# Patient Record
Sex: FEMALE | Race: WHITE
[De-identification: names, ages, dates, MRNs, and addresses within clinical notes are randomized per-mention and may not be internally consistent; named-entity substitution may affect disease eponyms.]

---

## 2017-07-31 ENCOUNTER — HOSPITAL ENCOUNTER (OUTPATIENT)
Dept: HOSPITAL 46 - OPS | Age: 77
Discharge: HOME | End: 2017-07-31
Attending: SURGERY
Payer: MEDICARE

## 2017-07-31 VITALS — BODY MASS INDEX: 33.57 KG/M2 | HEIGHT: 60 IN | WEIGHT: 170.99 LBS

## 2017-07-31 DIAGNOSIS — E78.5: ICD-10-CM

## 2017-07-31 DIAGNOSIS — K29.50: Primary | ICD-10-CM

## 2017-07-31 DIAGNOSIS — I10: ICD-10-CM

## 2017-07-31 DIAGNOSIS — K21.9: ICD-10-CM

## 2017-07-31 DIAGNOSIS — Z87.440: ICD-10-CM

## 2017-07-31 DIAGNOSIS — D64.9: ICD-10-CM

## 2017-07-31 DIAGNOSIS — M81.0: ICD-10-CM

## 2017-07-31 DIAGNOSIS — E03.9: ICD-10-CM

## 2017-07-31 DIAGNOSIS — K44.9: ICD-10-CM

## 2017-07-31 DIAGNOSIS — Z98.890: ICD-10-CM

## 2017-07-31 DIAGNOSIS — E66.9: ICD-10-CM

## 2017-07-31 PROCEDURE — 0DB38ZX EXCISION OF LOWER ESOPHAGUS, VIA NATURAL OR ARTIFICIAL OPENING ENDOSCOPIC, DIAGNOSTIC: ICD-10-PCS | Performed by: SURGERY

## 2017-07-31 PROCEDURE — 0DB78ZX EXCISION OF STOMACH, PYLORUS, VIA NATURAL OR ARTIFICIAL OPENING ENDOSCOPIC, DIAGNOSTIC: ICD-10-PCS | Performed by: SURGERY

## 2017-07-31 PROCEDURE — 0DB68ZX EXCISION OF STOMACH, VIA NATURAL OR ARTIFICIAL OPENING ENDOSCOPIC, DIAGNOSTIC: ICD-10-PCS | Performed by: SURGERY

## 2017-07-31 NOTE — NUR
07/31/17 0811 Jaycee Ayala
0805 - PT ARRIVED TO PACU. BP NOTED TO BE LOW, FLUIDS RUNNING, HEAD OF
BED LOWERED. PT RESPONDING APPROPRIATLY TO QUESTIONS. PT ORIENTED TO
PERSON, PLACE, TIME, AND SITUATION. PT MAINTAINING OWN AIRWAY AND
DENIES PAIN AND NAUSEA

## 2017-08-05 NOTE — OR
[facility]
                                    [address], [place]  [address]
_________________________________________________________________________________________
                                                                 Signed   
 
 
DATE OF SERVICE: 07/31/2017
 
PREOPERATIVE DIAGNOSES: 
Dark stool with anemia.
Unremarkable colonoscopy 1 year ago.
 
POSTOPERATIVE DIAGNOSES: 
Diffuse gastritis without active hemorrhage, small hiatal hernia without esophagitis.
 
PROCEDURE PERFORMED: Esophagogastroduodenoscopy with biopsy. 
 
SURGEON: Romi Soliman MD.
 
ANESTHESIA: 
Intravenous sedation, fentanyl 100 mcg, Versed 3 mg.
 
INDICATION: 
This 77-year-old white woman, is a patient of Dr. Reid and underwent colonoscopy in
2016, which was essentially normal. Certainly, no evidence of neoplasm or significant
polyp disease. She has developed dark stool and low-grade anemia. She has had no yudi
hematemesis. She denies any upper abdominal pain. She describes being "allergic" to
Prilosec. She is admitted at this time to undergo upper endoscopy to better characterize
the source of her complaint. Understand the risks of bleeding, infection, and
perforation.
 
FINDINGS: 
Esophagus was normal as was the duodenum. The stomach had diffuse gastritis. A punctate
type significant inflammatory change of the upper stomach. Biopsies were obtained.
CLOtest was negative 15 minutes post procedure. CLOtest will be monitored for at least
24 hours.
 
DESCRIPTION OF PROCEDURE: 
The patient brought to the endoscopy suite and placed in later al decubitus position
after undergoing topical Hurricaine spray hypopharyngeal anesthesia. A bite block was
placed after satisfactory intravenous sedation with full cardiopulmonary monitoring.
 
An Olympus video upper endoscope was placed in the hypopharynx. Vocal cords were
visualized as normal. Scope was advanced to the esophagus without problem. Throughout
its length, it was normal. Scope was advanced to the stomach, which was insufflated with
air. There was a fair amount of bile within the stomach. Rug a l folds appeared normal
in configuration. Scope was advanced to the antrum, which showed some mild erythematous
streaking of the pre-pyloric antrum. The pylorus itself was not deformed and there is no
 
    Electronically Signed By: ROMI SOLIMAN MD  08/05/17 1032
_________________________________________________________________________________________
PATIENT NAME:     SHASTA BEARDEN                  
MEDICAL RECORD #: [id number]                     OPERATIVE REPORT              
          ACCT #: [id number]  
DATE OF BIRTH:   [birth year]                                       
PHYSICIAN:   ROMI SOLIMAN MD                      REPORT #: [id number]
REPORT IS CONFIDENTIAL AND NOT TO BE RELEASED WITHOUT AUTHORIZATION
 
 
                                  [facility]
                                    [address], [place]  [address]
_________________________________________________________________________________________
                                                                 Signed   
 
 
pre-pyloric ulcer proper. The scope was passed into the duodenal, in the bulbar second
and third portions all appeared reasonably normal. Biopsies were taken of the second and
third portions. This was to assess for celiac disease. The scope was then withdrawn.
Biopsies taken of the antrum for both clinical and pathologic testing, and a retroflex
view was undertaken showing punctate diffuse gastritis throughout the stomach. An
atrophic appearing mucosa was noted. Biopsies were taken more proximally as well. There
was a hiatal hernia noted at the GE junction. The scope was straightened and withdrawn
to the distal esophagus, where biopsies were obtained. The mucosa appeared normal.
Careful withdrawal of scope showed no other abnormalities. Scope was removed, and the
patient was taken to recovery room in good condition.
 
CONCLUDING DIAGNOSIS: 
Diffuse gastritis likely accounting for symptoms.
 
PLAN 
We will prescribe Carafate 1 g slurry q.i.d. on empty stomach. Avoidance of ibuprofen as
much as possible in the near-term future would be appropriate. We will recheck CBC in
approximately 4 weeks and she can return to clinic in 4-6 weeks in followup.
 
 
_____________________________
MD TICO Holly/Dany
DD: 07/31/2017 08:11:14
DT: 07/31/2017 10:28:43
Job #: [id number]
 
 
 
 
 
 
 
 
 
 
 
 
 
 
 
 
    Electronically Signed By: ROMI SOLIMAN MD  08/05/17 1032
_________________________________________________________________________________________
PATIENT NAME:     SHASTA BEARDEN                  
MEDICAL RECORD #: [id number]                     OPERATIVE REPORT              
          ACCT #: [id number]  
DATE OF BIRTH:   [birth year]                                       
PHYSICIAN:   ROMI SOLIMAN MD                      REPORT #: [id number]
REPORT IS CONFIDENTIAL AND NOT TO BE RELEASED WITHOUT AUTHORIZATION

## 2022-07-24 ENCOUNTER — HOSPITAL ENCOUNTER (INPATIENT)
Dept: HOSPITAL 46 - ED | Age: 82
LOS: 2 days | Discharge: HOME | DRG: 641 | End: 2022-07-26
Attending: INTERNAL MEDICINE | Admitting: INTERNAL MEDICINE
Payer: MEDICARE

## 2022-07-24 VITALS — HEIGHT: 60 IN | BODY MASS INDEX: 35.58 KG/M2 | WEIGHT: 181.22 LBS

## 2022-07-24 DIAGNOSIS — I10: ICD-10-CM

## 2022-07-24 DIAGNOSIS — K59.00: ICD-10-CM

## 2022-07-24 DIAGNOSIS — Z79.82: ICD-10-CM

## 2022-07-24 DIAGNOSIS — Z98.890: ICD-10-CM

## 2022-07-24 DIAGNOSIS — I35.0: ICD-10-CM

## 2022-07-24 DIAGNOSIS — M81.0: ICD-10-CM

## 2022-07-24 DIAGNOSIS — N39.0: ICD-10-CM

## 2022-07-24 DIAGNOSIS — Z79.01: ICD-10-CM

## 2022-07-24 DIAGNOSIS — E87.1: ICD-10-CM

## 2022-07-24 DIAGNOSIS — Z20.822: ICD-10-CM

## 2022-07-24 DIAGNOSIS — Z91.013: ICD-10-CM

## 2022-07-24 DIAGNOSIS — K21.9: ICD-10-CM

## 2022-07-24 DIAGNOSIS — Z79.899: ICD-10-CM

## 2022-07-24 DIAGNOSIS — Z79.02: ICD-10-CM

## 2022-07-24 DIAGNOSIS — E78.00: ICD-10-CM

## 2022-07-24 DIAGNOSIS — Z79.2: ICD-10-CM

## 2022-07-24 DIAGNOSIS — Z88.8: ICD-10-CM

## 2022-07-24 DIAGNOSIS — I48.91: ICD-10-CM

## 2022-07-24 DIAGNOSIS — Z90.710: ICD-10-CM

## 2022-07-24 DIAGNOSIS — E03.9: ICD-10-CM

## 2022-07-24 DIAGNOSIS — E87.6: Primary | ICD-10-CM

## 2022-07-24 PROCEDURE — U0003 INFECTIOUS AGENT DETECTION BY NUCLEIC ACID (DNA OR RNA); SEVERE ACUTE RESPIRATORY SYNDROME CORONAVIRUS 2 (SARS-COV-2) (CORONAVIRUS DISEASE [COVID-19]), AMPLIFIED PROBE TECHNIQUE, MAKING USE OF HIGH THROUGHPUT TECHNOLOGIES AS DESCRIBED BY CMS-2020-01-R: HCPCS

## 2022-07-24 PROCEDURE — C9803 HOPD COVID-19 SPEC COLLECT: HCPCS

## 2022-07-24 PROCEDURE — A9270 NON-COVERED ITEM OR SERVICE: HCPCS

## 2022-07-25 NOTE — NUR
Pt continues to have gas and no BM, urine incontinence noted, assisted with
ambulation and dry attends provided.

## 2022-07-25 NOTE — NUR
VITALS AND I&OS CHARTED PATIENT
UP TO BSC FOR VOID AND BACK TO BED. RN IN ROOM AT THIS TIME. CALL
LIGHT IN EASY REACH.

## 2022-07-25 NOTE — NUR
Pt arrives to medical floor room 109. Pt A+O, VSS. Assessment WNL. Tele in
place, SR. IV SL WNL. Pt oriented to room/unit, states no needs. Call light in
reach.

## 2022-07-25 NOTE — NUR
PATIENT BACK IN BED AT THIS TIME AND RESTING. PT GIVEN SUPPOSITORY TO HELP HER
HAVE A BM, WHICH SHE STATES SHE HASN'T HAD IN 3 DAYS. PT FRUSTRATED WITH THIS.
PT'S FAMILY HAS LEFT FOR THE DAY BUT WERE HERE VISITING FOR SEVERAL HOURS THIS
AM. PT REMAINS IN SINUS RHYTHM. HR IN THE60s. PT DENIES ANY PAIN AT THIS TIME.
ASSESSMENT COMPLETE AND UNCHANGED FROM THIS AM. PT REMAINS SALINE LOCKED.
CONTINUE DAYNA ONITOR.

## 2022-07-25 NOTE — NUR
patient CALLED TO USE THE BATHROOM. SBA. PATIENT REFUSED TO USE WALKER.
PATIENT'S PULL UP AND GEOFFREY PAD CHANGED DUE TO INCONTINENT URINE. PATIENT IS
BACK IN BED. PATIENT DENIES FURTHER NEEDS AT THIS TIME. BED SIDE RAILS UP AND
BED ALARM ON FOR SAFETY. CALL LIGHT WITHIN REACH.

## 2022-07-25 NOTE — NUR
PATIENT TRANSFERRED TOMED/SURG, ROOM 109 IN BED. REPORT GIVEN TO TISHA JOSEPH WHO
IS RESUMING CARE OF PATIENT. PT'S RHONDA CELIS [phone] CALLED
TO UPDATE ON PT'S
MOVE. ALL BELONGINGS TRANSFERRED WITH PATIENT.

## 2022-07-25 NOTE — NUR
1PA TO BSC FOR VOID, PATIENT WAS ALSO INCONTINENT OF URINE DURING TRANSFER TO
BSC. VITALS AND I&OS CHARTED, FACE AND HANDS WASHED. PATIENT UP IN DR RICHARD MARTINEZ AT CHAIRSIDE. CALL LIGHT IN EASY REACH

## 2022-07-25 NOTE — NUR
BEDSIDE REPORT FROMDEIRDRE RN , PT RESTING IN BED EYES CLOSED RR REGULAR AT
16 BPM. NO DISTRESS NOTED AT THIS TIME.

## 2022-07-25 NOTE — NUR
DR. RAMOS IN ROOM TO SEE PATIENT AT THIS TIME. PT UP SITTING IN CHAIR,
VISITING WITH MD. PLAN OF CARE BEING DISCUSSED. PT REMAINS IN A SINUS RHYTHM
SINCE AROUND 0230 THIS AM. LAST /69. IVF CONTINUE  ML/HR. PT
APPEARS COMFORTABLE.

## 2022-07-25 NOTE — NUR
IN ROOM TO GIVE AM MEDS. PATIENT REPORTED NEEDING TO USE THE RESTROOM. 1
PERSON ASSIST TO BEDSIDE COMMODE. URGE INCONTINANCE WITH VOID. CLEAN ATTENDS
PLACE ON PT. HR BUMPED TO THE 90'S WITH AMBULATION.

## 2022-07-25 NOTE — NUR
REPORT RECEIVED FROM DAGO GILES. PATIENT ARRIVED VIA STRETCHER WITH FAMILY AT
BEDSIDE. PATIENT HAD TO USE THE RESTROOM AND USED THE BEDSIDE COMMODE. SHE
REOPRTS URGE INCONTINANCE AND BURNING DUE TO RECENT UTI.
ORIENTED TO CALL LIGHT AND ROOM.
FAMILY AT BEDSIDE. CALL LIGHT IN REACH.

## 2022-07-25 NOTE — NUR
ECHO IN ROOM AT THIS TIME AND PERFORMING ECHO. PT HELPED BACK TO BED WITH
ASSISTANCE OF CNA. PT UP TO BSC TO VOID AFTER INCONTINENCE IN ATTENDS. PT
REMAINS IN SINUS RHYTHM.

## 2022-07-25 NOTE — NUR
Pt assisted to use restroom, some incontinence of urine noted, clean dry
attends provided. Scheduled lovenox administered and pt educated, verbalizes
understanding about POC and has no questions.

## 2022-07-25 NOTE — NUR
Scheduled medications administered and pt finishing her dinner of a sandwich,
she states low appetite. Fresh water provided, family in room, questions
answered re: POC. They verbalize understanding. No needs at this time.

## 2022-07-25 NOTE — NUR
Spoke with pt and she states she lives with her spouse in a 1 story
house with few steps. She does not use DME, assists her  as he is
87. States she is active, cleans, cooks, does laundry, and yard work.
States she became very weak with AFIB and could not work. She also has
two daughters who assist them when needed.  Pt plans on dc to home
when cleared medically.  Pt convert to sr from afib.

## 2022-07-25 NOTE — NUR
PT SITTING UP AT BEDSIDE, SHE HAD REPORTED THAT SHE HAS BEEN TO THE BATHROOM
AND BACK BY HERSELF, SHE ALSO SAID SHE THOUGHT IT WAS 0900 AM RATHER THAN
NIGHT TIME. PT REORIENTED, SHE HAD SMALL BM, AND MISSED VOID X2. SHE IS NOW IN
BED WITH BED ALARM ON ASSESSMENT COMPLETE.

## 2022-07-25 NOTE — NUR
PATIENT BACK TO BED FOR ECHO. PATIENT CALLS APPROPRIATELY WHEN SHE NEEDS TO
VOID HOWEVER THE MINUTE SHE STANDS, SHE HAS LITTLE CONTROL OVER HER BLADDER
AND VOIDS IN HER ATTENDS.

## 2022-07-26 NOTE — EKG
[facility] [place] [facility]
                                    [address] [facility]
                                  [place], [place]  [address]
_________________________________________________________________________________________
                                                                 Signed   
 
 
Atrial fibrillation with rapid ventricular response
ST \T\ T wave abnormality, consider inferior ischemia Abnormal ECG No previous ECGs
available
Confirmed by BEATRIS RAMOS MD ([id number]) on 7/26/2022 7:26:39 AM
 
 
 
 
 
 
 
 
 
 
 
 
 
 
 
 
 
 
 
 
 
 
 
 
 
 
 
 
 
 
 
 
 
 
 
 
 
 
 
 
 
    Electronically Signed By: BEATRIS RAMOS MD  07/26/22 0726
_________________________________________________________________________________________
PATIENT NAME:     SHASTA BEARDEN                  
MEDICAL RECORD #: [id number]                     Electrocardiogram             
          ACCT #: [id number]  
DATE OF BIRTH:   [birth year]                                       
PHYSICIAN:   BEATRIS RAMOS MD                   REPORT #: [id number]
REPORT IS CONFIDENTIAL AND NOT TO BE RELEASED WITHOUT AUTHORIZATION

## 2022-07-26 NOTE — NUR
PT HAS BEEN SLEEPING INTERMITTENLY, SHE CALLS OR SETS OFF BED ALARM TO GET UP
TO USE THE BATHROOM ON AVERAGE EVERY TWO HOURS. SHE HAS NOT REPORTED ANY PAIN
OR NAUSEA OVER SHIFT. SHE IS ONE PERSON STANDBY ASSIST UP TO AMBULATE. WILL
COLLECT NEW URINE SAMPLE PER ORDERS BY END OF SHIFT. NO NEW CONCERNS THIS
SHIFT.

## 2022-07-26 NOTE — NUR
Report received from Vickie GILES. Pt resting in bed with eyes closed, even and
unlabored resporations, no needs identified, will continue plan of care.

## 2022-07-26 NOTE — NUR
PT USED CALL LIGHT FOR ASSISTANCE UP TO BATHROOM. ONE PERSON STANDBY ASSIST,
PT STEADY ON HER FEET THIS AM, V/S, I/O AND AM ASSESSMENT COMPLETE. BED ALARM
ON FOR PT SAFETY, CALL LIGHT IN REACH

## 2022-07-26 NOTE — NUR
BED ALARM SOUNDING. PATIENT GOT UP TO USE THE BATHROOM. SBA. PATIENT IS
URINATING  WHILE ON THE WAY TO THE BATHROOM. PATIENT'S PULL UPS WITH GEOFFREY PAD
CHANGED. PATIENT IS BACK IN BED. BED ALARM ON. PATIENT'S IV ON HER RIGHT ARM
PULLED OUT. PRIMARY RN NOTIFIED.

## 2022-07-26 NOTE — NUR
CALL LIGHT ANSWERED. PT UP TO BR TO VOID 100 ML CLEAR YELLOW URINE. PT
INCONTINENT ON THE WAY TO THE BATHROOM AS WELL. CLEAN ATTENDS PROVIDED. BACK
TO BED, KAMRYN WELL. DENIES NEEDS. CALL LIGHT IN REACH. BED ALARM FOR SAFETY.

## 2022-07-26 NOTE — NUR
Scheduled medications administered and assessment complete. Pt sitting up to
chair after breakfast and states her appetite has been improving. Family
arrives at bedside. Pt states had small BM during night and her "stomach is
feeling better". VSS, HRR, BP and rhythm WNL. Family questions regarding home
medications, Nereida from pharmacy in room to assist.

## 2022-07-26 NOTE — NUR
Spoke with Gwendolyn. She cont. to plan on dc today.  Denies any needs.  Daughter
and spouse will pick her up and transport her home.

## 2022-07-26 NOTE — NUR
PATIENT WENT TO THE BATHROOM SBA. PATIENT IS UP IN THE CHAIR. ALARM SET. URINE
SAMPLE COLLECTED AND SENT TO LAB.

## 2023-03-30 ENCOUNTER — HOSPITAL ENCOUNTER (EMERGENCY)
Dept: HOSPITAL 46 - ED | Age: 83
Discharge: HOME | End: 2023-03-30
Payer: MEDICARE

## 2023-03-30 VITALS — BODY MASS INDEX: 33.38 KG/M2 | WEIGHT: 170 LBS | HEIGHT: 60 IN

## 2023-03-30 DIAGNOSIS — I10: ICD-10-CM

## 2023-03-30 DIAGNOSIS — Z79.01: ICD-10-CM

## 2023-03-30 DIAGNOSIS — E78.5: ICD-10-CM

## 2023-03-30 DIAGNOSIS — R19.7: Primary | ICD-10-CM

## 2023-03-30 DIAGNOSIS — E03.9: ICD-10-CM

## 2023-03-30 DIAGNOSIS — Z88.8: ICD-10-CM

## 2023-03-30 DIAGNOSIS — Z91.013: ICD-10-CM

## 2023-03-30 DIAGNOSIS — Z79.899: ICD-10-CM

## 2025-03-30 ENCOUNTER — HOSPITAL ENCOUNTER (INPATIENT)
Dept: HOSPITAL 46 - ED | Age: 85
LOS: 2 days | Discharge: HOME | DRG: 378 | End: 2025-04-01
Attending: STUDENT IN AN ORGANIZED HEALTH CARE EDUCATION/TRAINING PROGRAM | Admitting: STUDENT IN AN ORGANIZED HEALTH CARE EDUCATION/TRAINING PROGRAM
Payer: MEDICARE

## 2025-03-30 VITALS — DIASTOLIC BLOOD PRESSURE: 56 MMHG | SYSTOLIC BLOOD PRESSURE: 117 MMHG

## 2025-03-30 VITALS — SYSTOLIC BLOOD PRESSURE: 117 MMHG | DIASTOLIC BLOOD PRESSURE: 56 MMHG

## 2025-03-30 VITALS — SYSTOLIC BLOOD PRESSURE: 117 MMHG | DIASTOLIC BLOOD PRESSURE: 49 MMHG

## 2025-03-30 VITALS — SYSTOLIC BLOOD PRESSURE: 112 MMHG | DIASTOLIC BLOOD PRESSURE: 47 MMHG

## 2025-03-30 VITALS — BODY MASS INDEX: 32.5 KG/M2 | WEIGHT: 176.59 LBS | HEIGHT: 62 IN

## 2025-03-30 VITALS — DIASTOLIC BLOOD PRESSURE: 49 MMHG | SYSTOLIC BLOOD PRESSURE: 117 MMHG

## 2025-03-30 DIAGNOSIS — Z98.890: ICD-10-CM

## 2025-03-30 DIAGNOSIS — D62: ICD-10-CM

## 2025-03-30 DIAGNOSIS — Z79.890: ICD-10-CM

## 2025-03-30 DIAGNOSIS — Z79.01: ICD-10-CM

## 2025-03-30 DIAGNOSIS — E78.00: ICD-10-CM

## 2025-03-30 DIAGNOSIS — I48.91: ICD-10-CM

## 2025-03-30 DIAGNOSIS — K21.9: ICD-10-CM

## 2025-03-30 DIAGNOSIS — Z66: ICD-10-CM

## 2025-03-30 DIAGNOSIS — Z91.013: ICD-10-CM

## 2025-03-30 DIAGNOSIS — K52.9: ICD-10-CM

## 2025-03-30 DIAGNOSIS — Z88.8: ICD-10-CM

## 2025-03-30 DIAGNOSIS — Z79.899: ICD-10-CM

## 2025-03-30 DIAGNOSIS — M81.0: ICD-10-CM

## 2025-03-30 DIAGNOSIS — I35.0: ICD-10-CM

## 2025-03-30 DIAGNOSIS — E03.9: ICD-10-CM

## 2025-03-30 DIAGNOSIS — K63.5: ICD-10-CM

## 2025-03-30 DIAGNOSIS — K44.9: ICD-10-CM

## 2025-03-30 DIAGNOSIS — K57.33: Primary | ICD-10-CM

## 2025-03-30 LAB
ABO GROUP BLD: (no result)
ABO GROUP BLD: (no result)
ALBUMIN SERPL-MCNC: 3.2 G/DL (ref 3.4–5)
ALBUMIN/GLOB SERPL: 1.23 {RATIO} (ref 1.1–2.4)
APTT PPP: 22.9 SEC (ref 22.9–41.3)
BASOPHILS NFR BLD AUTO: 0.4 % (ref 0–2)
BUN/CREAT SERPL: 14.94 (ref 6–28.6)
DEPRECATED RDW RBC AUTO: 14.3 FL (ref 10.5–15)
EOSINOPHIL NFR BLD AUTO: 1.1 % (ref 0–6)
GLOBULIN SER-MCNC: 2.6 G/DL (ref 1.8–3.5)
HCT VFR BLD AUTO: 24.9 % (ref 35–50)
HGB BLD-MCNC: 8.4 G/DL (ref 12–18)
IAT POLY-SP REAG SERPL QL: NEGATIVE
INR PPP: 1.73 (ref 0.8–1.3)
LYMPHOCYTES NFR BLD AUTO: 20.8 % (ref 24–44)
MAJ XM SERPL IMM SPIN-IMP: (no result)
MCH RBC QN AUTO: 30.8 PG (ref 27–36)
MCHC RBC AUTO-ENTMCNC: 33.9 G/DL (ref 30–36)
MONOCYTES NFR BLD AUTO: 9.1 % (ref 0–12)
NEUTROPHILS NFR BLD AUTO: 68.6 % (ref 39–80)
PLATELET # BLD AUTO: 192 K/UL (ref 140–440)
PROT SERPL-MCNC: 5.8 G/DL (ref 6.4–8.2)
PROTHROMBIN TIME: 20.3 SEC (ref 11.2–14.2)
RBC # BLD AUTO: 2.74 M/UL (ref 4.3–5.7)
RH BLD: POSITIVE
RH BLD: POSITIVE

## 2025-03-30 PROCEDURE — A9270 NON-COVERED ITEM OR SERVICE: HCPCS

## 2025-03-30 PROCEDURE — P9016 RBC LEUKOCYTES REDUCED: HCPCS

## 2025-03-31 VITALS — DIASTOLIC BLOOD PRESSURE: 52 MMHG | SYSTOLIC BLOOD PRESSURE: 130 MMHG

## 2025-03-31 VITALS — DIASTOLIC BLOOD PRESSURE: 51 MMHG | SYSTOLIC BLOOD PRESSURE: 127 MMHG

## 2025-03-31 VITALS — SYSTOLIC BLOOD PRESSURE: 112 MMHG | DIASTOLIC BLOOD PRESSURE: 48 MMHG

## 2025-03-31 VITALS — DIASTOLIC BLOOD PRESSURE: 48 MMHG | SYSTOLIC BLOOD PRESSURE: 107 MMHG

## 2025-03-31 VITALS — DIASTOLIC BLOOD PRESSURE: 56 MMHG | SYSTOLIC BLOOD PRESSURE: 136 MMHG

## 2025-03-31 VITALS — SYSTOLIC BLOOD PRESSURE: 121 MMHG | DIASTOLIC BLOOD PRESSURE: 53 MMHG

## 2025-03-31 VITALS — DIASTOLIC BLOOD PRESSURE: 53 MMHG | SYSTOLIC BLOOD PRESSURE: 110 MMHG

## 2025-03-31 VITALS — SYSTOLIC BLOOD PRESSURE: 131 MMHG | DIASTOLIC BLOOD PRESSURE: 45 MMHG

## 2025-03-31 LAB
ABO GROUP BLD: (no result)
ANION GAP SERPL CALCULATED.4IONS-SCNC: 11.9 MMOL/L (ref 7–21)
BASOPHILS NFR BLD AUTO: 0.8 % (ref 0–2)
BUN/CREAT SERPL: 14.7 (ref 6–28.6)
CALCIUM SERPL-MCNC: 8.5 MG/DL (ref 8.5–10.1)
DEPRECATED RDW RBC AUTO: 14.7 FL (ref 10.5–15)
EOSINOPHIL NFR BLD AUTO: 1.6 % (ref 0–6)
HCT VFR BLD AUTO: 21.1 % (ref 35–50)
HGB BLD-MCNC: 7.2 G/DL (ref 12–18)
IAT POLY-SP REAG SERPL QL: NEGATIVE
INR PPP: 1.35 (ref 0.8–1.3)
LYMPHOCYTES NFR BLD AUTO: 29.1 % (ref 24–44)
MAGNESIUM SERPL-MCNC: 1.9 MG/DL (ref 1.8–2.4)
MAJ XM SERPL IMM SPIN-IMP: (no result)
MCH RBC QN AUTO: 30.9 PG (ref 27–36)
MCHC RBC AUTO-ENTMCNC: 34.3 G/DL (ref 30–36)
MCV RBC AUTO: 90.2 FL (ref 81–99)
NEUTROPHILS NFR BLD AUTO: 59.5 % (ref 39–80)
PLATELET # BLD AUTO: 149 K/UL (ref 140–440)
POTASSIUM SERPL-SCNC: 3.9 MMOL/L (ref 3.5–5.1)
PROTHROMBIN TIME: 16.7 SEC (ref 11.2–14.2)
RBC # BLD AUTO: 2.34 M/UL (ref 4.3–5.7)
RH BLD: POSITIVE

## 2025-03-31 PROCEDURE — 0DB78ZX EXCISION OF STOMACH, PYLORUS, VIA NATURAL OR ARTIFICIAL OPENING ENDOSCOPIC, DIAGNOSTIC: ICD-10-PCS | Performed by: SURGERY

## 2025-03-31 PROCEDURE — 0DBH8ZZ EXCISION OF CECUM, VIA NATURAL OR ARTIFICIAL OPENING ENDOSCOPIC: ICD-10-PCS | Performed by: SURGERY

## 2025-03-31 PROCEDURE — 0DB58ZX EXCISION OF ESOPHAGUS, VIA NATURAL OR ARTIFICIAL OPENING ENDOSCOPIC, DIAGNOSTIC: ICD-10-PCS | Performed by: SURGERY

## 2025-04-01 VITALS — SYSTOLIC BLOOD PRESSURE: 111 MMHG | DIASTOLIC BLOOD PRESSURE: 54 MMHG

## 2025-04-01 VITALS — DIASTOLIC BLOOD PRESSURE: 54 MMHG | SYSTOLIC BLOOD PRESSURE: 111 MMHG

## 2025-04-01 VITALS — SYSTOLIC BLOOD PRESSURE: 106 MMHG | DIASTOLIC BLOOD PRESSURE: 73 MMHG

## 2025-04-01 LAB
ALBUMIN SERPL-MCNC: 2.5 G/DL (ref 3.4–5)
ALBUMIN/GLOB SERPL: 1.04 {RATIO} (ref 1.1–2.4)
ANION GAP SERPL CALCULATED.4IONS-SCNC: 10.3 MMOL/L (ref 7–21)
BASOPHILS NFR BLD AUTO: 0.6 % (ref 0–2)
BUN/CREAT SERPL: 10.29 (ref 6–28.6)
CALCIUM SERPL-MCNC: 8.5 MG/DL (ref 8.5–10.1)
DEPRECATED RDW RBC AUTO: 14.8 FL (ref 10.5–15)
EOSINOPHIL NFR BLD AUTO: 2.5 % (ref 0–6)
GLOBULIN SER-MCNC: 2.4 G/DL (ref 1.8–3.5)
HCT VFR BLD AUTO: 23.6 % (ref 35–50)
HGB BLD-MCNC: 8.4 G/DL (ref 12–18)
LYMPHOCYTES NFR BLD AUTO: 20.8 % (ref 24–44)
MCH RBC QN AUTO: 31.1 PG (ref 27–36)
MCHC RBC AUTO-ENTMCNC: 35.5 G/DL (ref 30–36)
MCV RBC AUTO: 87.7 FL (ref 81–99)
MONOCYTES NFR BLD AUTO: 8.8 % (ref 0–12)
NEUTROPHILS NFR BLD AUTO: 67.3 % (ref 39–80)
PLATELET # BLD AUTO: 157 K/UL (ref 140–440)
POTASSIUM SERPL-SCNC: 3.3 MMOL/L (ref 3.5–5.1)
PROT SERPL-MCNC: 4.9 G/DL (ref 6.4–8.2)
RBC # BLD AUTO: 2.69 M/UL (ref 4.3–5.7)